# Patient Record
Sex: FEMALE | Race: WHITE | NOT HISPANIC OR LATINO | Employment: FULL TIME | URBAN - METROPOLITAN AREA
[De-identification: names, ages, dates, MRNs, and addresses within clinical notes are randomized per-mention and may not be internally consistent; named-entity substitution may affect disease eponyms.]

---

## 2022-07-15 ENCOUNTER — OFFICE VISIT (OUTPATIENT)
Dept: URGENT CARE | Facility: CLINIC | Age: 47
End: 2022-07-15

## 2022-07-15 VITALS
WEIGHT: 195 LBS | TEMPERATURE: 98.3 F | HEART RATE: 62 BPM | BODY MASS INDEX: 29.55 KG/M2 | HEIGHT: 68 IN | DIASTOLIC BLOOD PRESSURE: 78 MMHG | RESPIRATION RATE: 16 BRPM | OXYGEN SATURATION: 97 % | SYSTOLIC BLOOD PRESSURE: 124 MMHG

## 2022-07-15 DIAGNOSIS — J98.8 VIRAL RESPIRATORY ILLNESS: ICD-10-CM

## 2022-07-15 DIAGNOSIS — H61.23 BILATERAL IMPACTED CERUMEN: ICD-10-CM

## 2022-07-15 DIAGNOSIS — H66.91 ACUTE INFECTION OF RIGHT EAR: ICD-10-CM

## 2022-07-15 DIAGNOSIS — H93.8X1 SENSATION OF PLUGGED EAR ON RIGHT SIDE: ICD-10-CM

## 2022-07-15 DIAGNOSIS — B97.89 VIRAL RESPIRATORY ILLNESS: ICD-10-CM

## 2022-07-15 LAB
EXTERNAL QUALITY CONTROL: NORMAL
SARS-COV+SARS-COV-2 AG RESP QL IA.RAPID: NEGATIVE

## 2022-07-15 PROCEDURE — 99203 OFFICE O/P NEW LOW 30 MIN: CPT | Performed by: PHYSICIAN ASSISTANT

## 2022-07-15 PROCEDURE — 87426 SARSCOV CORONAVIRUS AG IA: CPT | Performed by: PHYSICIAN ASSISTANT

## 2022-07-15 RX ORDER — AMOXICILLIN AND CLAVULANATE POTASSIUM 875; 125 MG/1; MG/1
1 TABLET, FILM COATED ORAL 2 TIMES DAILY
Qty: 14 TABLET | Refills: 0 | Status: SHIPPED | OUTPATIENT
Start: 2022-07-15 | End: 2022-07-22

## 2022-07-15 ASSESSMENT — ENCOUNTER SYMPTOMS
VOMITING: 0
MYALGIAS: 0
HEADACHES: 1
FEVER: 1
EYE DISCHARGE: 0
COUGH: 0
DIARRHEA: 0
SORE THROAT: 1
EYE REDNESS: 0
SHORTNESS OF BREATH: 0
NAUSEA: 0

## 2022-07-15 NOTE — PROGRESS NOTES
Ketan Watts is a 47 y.o. female who presents with Ear Fullness (X2days, Right ear, pain, sore throat, congestion, pt thinks it may be a head cold )            URI   This is a new problem. Episode onset: x 2 days ago. The problem has been unchanged. Maximum temperature: The patient reports a low-grade fever. Associated symptoms include congestion, ear pain (The patient reports pain to the right ear with an increased plugged sensation.), headaches, a plugged ear sensation and a sore throat. Pertinent negatives include no chest pain, coughing, diarrhea, nausea or vomiting. She has tried acetaminophen for the symptoms.     The patient reports no recent sick contacts.  No known exposure to COVID-19.  No known exposure to influenza.    PMH:  has no past medical history on file.  MEDS:   Current Outpatient Medications:   •  Escitalopram Oxalate (LEXAPRO PO), Take  by mouth., Disp: , Rfl:   •  amoxicillin (AMOXIL) 500 MG Cap, Take 1 Cap by mouth 3 times a day. (Patient not taking: Reported on 7/15/2022), Disp: 30 Cap, Rfl: 0  •  omeprazole (PRILOSEC) 40 MG delayed-release capsule, Take 1 Cap by mouth every day. (Patient not taking: Reported on 7/15/2022), Disp: 30 Cap, Rfl: 0  •  sucralfate (CARAFATE) 1 GM Tab, Take 1 Tab by mouth 4 Times a Day,Before Meals and at Bedtime. (Patient not taking: Reported on 7/15/2022), Disp: 120 Tab, Rfl: 3  •  ondansetron (ZOFRAN ODT) 4 MG TABLET DISPERSIBLE, Take 1 Tab by mouth every 8 hours as needed for Nausea/Vomiting. (Patient not taking: Reported on 7/15/2022), Disp: 10 Tab, Rfl: 0  ALLERGIES: No Known Allergies  SURGHX:   Past Surgical History:   Procedure Laterality Date   • OPEN REDUCTION     • TONSILLECTOMY       SOCHX:  reports that she has never smoked. She has never used smokeless tobacco. She reports current alcohol use. She reports that she does not use drugs.  FH: Family history was reviewed, no pertinent findings to report    Review of Systems  "  Constitutional: Positive for fever (The patient reports a low-grade fever.).   HENT: Positive for congestion, ear pain (The patient reports pain to the right ear with an increased plugged sensation.) and sore throat.    Eyes: Negative for discharge and redness.   Respiratory: Negative for cough and shortness of breath.    Cardiovascular: Negative for chest pain.   Gastrointestinal: Negative for diarrhea, nausea and vomiting.   Musculoskeletal: Negative for myalgias.   Neurological: Positive for headaches.          Objective     /78   Pulse 62   Temp 36.8 °C (98.3 °F) (Temporal)   Resp 16   Ht 1.727 m (5' 8\")   Wt 88.5 kg (195 lb)   LMP 07/10/2022 (Exact Date)   SpO2 97%   Breastfeeding No   BMI 29.65 kg/m²      Physical Exam  Constitutional:       General: She is not in acute distress.     Appearance: Normal appearance. She is not ill-appearing.   HENT:      Head: Normocephalic and atraumatic.      Right Ear: External ear normal. There is impacted cerumen.      Left Ear: External ear normal. There is impacted cerumen.      Nose: Nose normal.      Mouth/Throat:      Mouth: Mucous membranes are moist.      Pharynx: Oropharynx is clear. No posterior oropharyngeal erythema.   Eyes:      Extraocular Movements: Extraocular movements intact.      Conjunctiva/sclera: Conjunctivae normal.   Cardiovascular:      Rate and Rhythm: Normal rate and regular rhythm.      Heart sounds: Normal heart sounds.   Pulmonary:      Effort: Pulmonary effort is normal. No respiratory distress.      Breath sounds: Normal breath sounds. No wheezing.   Musculoskeletal:         General: Normal range of motion.      Cervical back: Normal range of motion and neck supple.   Skin:     General: Skin is warm and dry.   Neurological:      Mental Status: She is alert and oriented to person, place, and time.               Progress:  POCT Rapid COVID-19: NEGATIVE     Cerumen Removal:   Attempted to remove the cerumen from the patient's " bilateral ear canals via lavage.  Successful removal of a portion of the impacted cerumen from the right ear canal.  Unfortunately, the impacted cerumen to the left ear canal was unable to be removed today in clinic due to the patient's level of discomfort.  On re-examination, the patient had some remaining cerumen to the distal aspect of the right ear canal.  The portion of the patient's right TM was visible and appeared inflamed, erythematous, and infected.  Again, the patient's left TM was not visualized due to continued impacted cerumen.          Assessment & Plan      1. Viral respiratory illness  - POCT SARS-COV Antigen YOLIS (Symptomatic only)    2. Sensation of plugged ear on right side    3. Bilateral impacted cerumen    4. Acute infection of right ear  - amoxicillin-clavulanate (AUGMENTIN) 875-125 MG Tab; Take 1 Tablet by mouth 2 times a day for 7 days.  Dispense: 14 Tablet; Refill: 0    The patient's presenting symptoms and physical exam endings are consistent with a viral respiratory illness.  The patient is experiencing a plugged sensation of the right ear with associated ear pain.  On physical exam, the patient had impacted cerumen to the bilateral ear canals.  The remainder the patient's physical exam today in clinic was normal.  The patient's posterior pharynx was clear without tonsil hypertrophy or exudates.  The patient's lungs were clear to auscultation without wheezing, and her pulse ox was within normal limits.  The patient is nontoxic and appears in no acute distress.  The patient's vital signs are stable and within normal limits.  She is afebrile today in clinic.  The patient's POCT rapid COVID-19 testing today in clinic was negative.  Attempted to remove the impacted cerumen from the patient's bilateral ear canals today in clinic.  A portion of the impacted cerumen was successfully removed from the right ear canal.  However, on reexamination the patient had some remaining cerumen to the distal  aspect of the right ear canal.  A portion of the patient's right TM was visible, and appeared inflamed, erythematous, and infected.  Unfortunately, the impacted cerumen to the left ear canal was unable to be removed in clinic due to the patient's level of discomfort.  Will prescribe the patient Augmentin for an acute ear infection of the right ear.  Advised patient to monitor worsening signs or symptoms.  Recommend OTC medications and supportive care for symptomatic management.  Recommend patient follow-up with her PCP as needed.  Discussed return precautions with patient, and she verbalized understanding.    Differential diagnoses, supportive care, and indications for immediate follow-up discussed with patient.   Instructed to return to clinic or nearest emergency department for any change in condition, further concerns, or worsening of symptoms.    OTC Tylenol or Motrin for fever/discomfort.  OTC cough/cold medication for symptomatic relief  OTC Supportive Care for Congestion - saline nasal spray or neti pot  Drink plenty of fluids  Follow-up with PCP  Return to clinic or go to the ED if symptoms worsen or fail to improve, or if the patient should develop worsening/increasing cough, congestion, ear pain, sore throat, shortness of breath, wheezing, chest pain, fever/chills, and/or any concerning symptoms.    Discussed plan with the patient, and she agrees to the above.     I personally reviewed prior external notes and test results pertinent to today's visit.  I have independently reviewed and interpreted all diagnostics ordered during this urgent care visit.       Please note that this dictation was created using voice recognition software. I have made every reasonable attempt to correct obvious errors, but I expect that there may be errors of grammar and possibly content that I did not discover before finalizing the note.     This note was electronically signed by Maeve Rod PA-C